# Patient Record
Sex: MALE | Race: WHITE | Employment: OTHER | URBAN - METROPOLITAN AREA
[De-identification: names, ages, dates, MRNs, and addresses within clinical notes are randomized per-mention and may not be internally consistent; named-entity substitution may affect disease eponyms.]

---

## 2020-08-05 ENCOUNTER — CARE COORDINATION (OUTPATIENT)
Dept: CARE COORDINATION | Age: 65
End: 2020-08-05

## 2021-07-03 ENCOUNTER — APPOINTMENT (OUTPATIENT)
Dept: GENERAL RADIOLOGY | Age: 66
End: 2021-07-03
Payer: COMMERCIAL

## 2021-07-03 ENCOUNTER — APPOINTMENT (OUTPATIENT)
Dept: CT IMAGING | Age: 66
End: 2021-07-03
Payer: COMMERCIAL

## 2021-07-03 ENCOUNTER — HOSPITAL ENCOUNTER (EMERGENCY)
Age: 66
Discharge: HOME | End: 2021-07-03
Attending: EMERGENCY MEDICINE
Payer: COMMERCIAL

## 2021-07-03 VITALS
HEART RATE: 95 BPM | BODY MASS INDEX: 23.7 KG/M2 | HEIGHT: 69 IN | RESPIRATION RATE: 21 BRPM | OXYGEN SATURATION: 95 % | TEMPERATURE: 98 F | DIASTOLIC BLOOD PRESSURE: 67 MMHG | SYSTOLIC BLOOD PRESSURE: 107 MMHG | WEIGHT: 160 LBS

## 2021-07-03 DIAGNOSIS — R06.89 DYSPNEA AND RESPIRATORY ABNORMALITIES: ICD-10-CM

## 2021-07-03 DIAGNOSIS — F10.920 ACUTE ALCOHOLIC INTOXICATION WITHOUT COMPLICATION (HCC): Primary | ICD-10-CM

## 2021-07-03 DIAGNOSIS — R06.00 DYSPNEA AND RESPIRATORY ABNORMALITIES: ICD-10-CM

## 2021-07-03 LAB
A/G RATIO: 0.8 (ref 1.1–2.2)
ALBUMIN SERPL-MCNC: 3.6 G/DL (ref 3.4–5)
ALP BLD-CCNC: 84 U/L (ref 40–129)
ALT SERPL-CCNC: 15 U/L (ref 10–40)
ANION GAP SERPL CALCULATED.3IONS-SCNC: 14 MMOL/L (ref 3–16)
AST SERPL-CCNC: 28 U/L (ref 15–37)
BASE EXCESS VENOUS: -2 MMOL/L (ref -3–3)
BASOPHILS ABSOLUTE: 0.1 K/UL (ref 0–0.2)
BASOPHILS RELATIVE PERCENT: 1.1 %
BILIRUB SERPL-MCNC: <0.2 MG/DL (ref 0–1)
BUN BLDV-MCNC: 5 MG/DL (ref 7–20)
CALCIUM SERPL-MCNC: 8.6 MG/DL (ref 8.3–10.6)
CARBOXYHEMOGLOBIN: 7.5 % (ref 0–1.5)
CHLORIDE BLD-SCNC: 96 MMOL/L (ref 99–110)
CO2: 22 MMOL/L (ref 21–32)
CREAT SERPL-MCNC: 0.6 MG/DL (ref 0.8–1.3)
EOSINOPHILS ABSOLUTE: 0.1 K/UL (ref 0–0.6)
EOSINOPHILS RELATIVE PERCENT: 1.1 %
GFR AFRICAN AMERICAN: >60
GFR NON-AFRICAN AMERICAN: >60
GLOBULIN: 4.5 G/DL
GLUCOSE BLD-MCNC: 100 MG/DL (ref 70–99)
GLUCOSE BLD-MCNC: 100 MG/DL (ref 70–99)
HCO3 VENOUS: 22.4 MMOL/L (ref 23–29)
HCT VFR BLD CALC: 43.5 % (ref 40.5–52.5)
HEMOGLOBIN: 14.7 G/DL (ref 13.5–17.5)
LYMPHOCYTES ABSOLUTE: 1.7 K/UL (ref 1–5.1)
LYMPHOCYTES RELATIVE PERCENT: 23.6 %
MCH RBC QN AUTO: 32.4 PG (ref 26–34)
MCHC RBC AUTO-ENTMCNC: 33.8 G/DL (ref 31–36)
MCV RBC AUTO: 95.9 FL (ref 80–100)
METHEMOGLOBIN VENOUS: 0.1 %
MONOCYTES ABSOLUTE: 0.8 K/UL (ref 0–1.3)
MONOCYTES RELATIVE PERCENT: 10.9 %
NEUTROPHILS ABSOLUTE: 4.5 K/UL (ref 1.7–7.7)
NEUTROPHILS RELATIVE PERCENT: 63.3 %
O2 CONTENT, VEN: 20 VOL %
O2 SAT, VEN: 100 %
O2 THERAPY: ABNORMAL
PCO2, VEN: 36.5 MMHG (ref 40–50)
PDW BLD-RTO: 14.3 % (ref 12.4–15.4)
PERFORMED ON: ABNORMAL
PH VENOUS: 7.4 (ref 7.35–7.45)
PLATELET # BLD: 345 K/UL (ref 135–450)
PMV BLD AUTO: 7.7 FL (ref 5–10.5)
PO2, VEN: 171 MMHG (ref 25–40)
POTASSIUM REFLEX MAGNESIUM: 4 MMOL/L (ref 3.5–5.1)
PRO-BNP: 191 PG/ML (ref 0–124)
RBC # BLD: 4.54 M/UL (ref 4.2–5.9)
SODIUM BLD-SCNC: 132 MMOL/L (ref 136–145)
TCO2 CALC VENOUS: 53 MMOL/L
TOTAL PROTEIN: 8.1 G/DL (ref 6.4–8.2)
TROPONIN: <0.01 NG/ML
WBC # BLD: 7.1 K/UL (ref 4–11)

## 2021-07-03 PROCEDURE — 94761 N-INVAS EAR/PLS OXIMETRY MLT: CPT

## 2021-07-03 PROCEDURE — 93005 ELECTROCARDIOGRAM TRACING: CPT | Performed by: EMERGENCY MEDICINE

## 2021-07-03 PROCEDURE — 82803 BLOOD GASES ANY COMBINATION: CPT

## 2021-07-03 PROCEDURE — 96375 TX/PRO/DX INJ NEW DRUG ADDON: CPT

## 2021-07-03 PROCEDURE — 85025 COMPLETE CBC W/AUTO DIFF WBC: CPT

## 2021-07-03 PROCEDURE — 80053 COMPREHEN METABOLIC PANEL: CPT

## 2021-07-03 PROCEDURE — 71045 X-RAY EXAM CHEST 1 VIEW: CPT

## 2021-07-03 PROCEDURE — 96374 THER/PROPH/DIAG INJ IV PUSH: CPT

## 2021-07-03 PROCEDURE — 2580000003 HC RX 258: Performed by: EMERGENCY MEDICINE

## 2021-07-03 PROCEDURE — 6360000002 HC RX W HCPCS: Performed by: EMERGENCY MEDICINE

## 2021-07-03 PROCEDURE — 94640 AIRWAY INHALATION TREATMENT: CPT

## 2021-07-03 PROCEDURE — 83880 ASSAY OF NATRIURETIC PEPTIDE: CPT

## 2021-07-03 PROCEDURE — 99285 EMERGENCY DEPT VISIT HI MDM: CPT

## 2021-07-03 PROCEDURE — 70450 CT HEAD/BRAIN W/O DYE: CPT

## 2021-07-03 PROCEDURE — 6370000000 HC RX 637 (ALT 250 FOR IP): Performed by: EMERGENCY MEDICINE

## 2021-07-03 PROCEDURE — 84484 ASSAY OF TROPONIN QUANT: CPT

## 2021-07-03 RX ORDER — LORAZEPAM 2 MG/ML
1 INJECTION INTRAMUSCULAR ONCE
Status: COMPLETED | OUTPATIENT
Start: 2021-07-03 | End: 2021-07-03

## 2021-07-03 RX ORDER — METHYLPREDNISOLONE SODIUM SUCCINATE 125 MG/2ML
80 INJECTION, POWDER, LYOPHILIZED, FOR SOLUTION INTRAMUSCULAR; INTRAVENOUS ONCE
Status: COMPLETED | OUTPATIENT
Start: 2021-07-03 | End: 2021-07-03

## 2021-07-03 RX ORDER — IPRATROPIUM BROMIDE AND ALBUTEROL SULFATE 2.5; .5 MG/3ML; MG/3ML
1 SOLUTION RESPIRATORY (INHALATION) ONCE
Status: COMPLETED | OUTPATIENT
Start: 2021-07-03 | End: 2021-07-03

## 2021-07-03 RX ORDER — 0.9 % SODIUM CHLORIDE 0.9 %
1000 INTRAVENOUS SOLUTION INTRAVENOUS ONCE
Status: COMPLETED | OUTPATIENT
Start: 2021-07-03 | End: 2021-07-03

## 2021-07-03 RX ADMIN — SODIUM CHLORIDE 1000 ML: 9 INJECTION, SOLUTION INTRAVENOUS at 12:02

## 2021-07-03 RX ADMIN — IPRATROPIUM BROMIDE AND ALBUTEROL SULFATE 1 AMPULE: .5; 3 SOLUTION RESPIRATORY (INHALATION) at 11:46

## 2021-07-03 RX ADMIN — METHYLPREDNISOLONE SODIUM SUCCINATE 80 MG: 125 INJECTION, POWDER, FOR SOLUTION INTRAMUSCULAR; INTRAVENOUS at 12:03

## 2021-07-03 RX ADMIN — LORAZEPAM 1 MG: 2 INJECTION INTRAMUSCULAR; INTRAVENOUS at 12:02

## 2021-07-03 NOTE — ED PROVIDER NOTES
EMERGENCY DEPARTMENT PROVIDER NOTE    Patient Identification  Pt Name: Laurel Brooks  MRN: [id number]  [place] 1955  Date of evaluation: 7/3/2021  Provider: Nicola Ortiz DO  PCP: No primary care provider on file. Chief Complaint  Alcohol Intoxication (pt states 3 25 oz 8% ETOH beers this am and 3 last night, pt seen by neighbors attempting to get up from ground unable, urinated on self as well, no family to care for)      HPI  (History provided by patient)  This is a 78 y.o. male with pertinent past medical history of tobacco use, COPD who was brought in by EMS transportation for alcohol intoxication. Patient was found in front of his home on the ground, he was alert however he was unable to stand under his own power and thus EMS was called. Patient states has been drinking multiple 25 ounce beers this morning, states this is fairly typical for him. He denies any history of alcohol withdrawal symptoms in the past.  He does report some mild shortness of breath, nothing seems to make this any better or worse. He attributes this to smoking. He denies any fevers, chills, chest pains, headaches, focal weakness or numbness. He denies any suicidal ideation. .     ROS    Const:  No fevers, no chills, no generalized weakness  Skin:  No rash, no lesions  Eyes:  No visual changes, no blurry or double vision, no pain  ENT:  No sore throat, no difficulty swallowing, no ear pain, no sinus pain or congestion  Card:  No chest pain, no palpitations, no edema  Resp:  +shortness of breath, +cough (chronic), no wheezing  Abd:  No abdominal pain, no nausea, no vomiting, no diarrhea  Genitourinary:  No dysuria, no hematuria  MSK:  No joint pain, no myalgia  Neuro:  No focal weakness, no headache, no paresthesia    All other systems reviewed and negative unless otherwise noted in HPI        I have reviewed the following nursing documentation:  Allergies: Patient has no known allergies.     Past medical history: Past Medical History:   Diagnosis Date    COPD (chronic obstructive pulmonary disease) ([facility] [address]      Past surgical history: No past surgical history on file. Home medications:   Previous Medications    UNKNOWN TO PATIENT    Pt states takes inhalers, nebulizers but unsure what       Social history:  reports that he has been smoking cigarettes. He has been smoking about 1.00 pack per day. He has never used smokeless tobacco. He reports current alcohol use of about 63.0 standard drinks of alcohol per week. He reports current drug use. Drug: Marijuana. Family history:  No family history on file. Exam  ED Triage Vitals [07/03/21 1020]   BP Temp Temp Source Pulse Resp SpO2 Height Weight   (!) 136/92 98 °F (36.7 °C) Oral 108 18 95 % 5' 9\" (1.753 m) 160 lb (72.6 kg)     Nursing note and vitals reviewed. Constitutional: Well developed, well nourished. Non-toxic in appearance. HENT:      Head: Normocephalic and atraumatic. Ears: External ears normal.      Nose: Nose normal.     Mouth: Membrane mucosa moist and pink. Eyes: Anicteric sclera. No discharge. PERRL, normal test of skew  Neck: Supple. Trachea midline. Cardiovascular: Tachycardia, regular rhythm; no murmurs, rubs, or gallops. Pulmonary/Chest: Effort normal. No respiratory distress. Diffuse wheezes. No stridor. No wheezes. No rales. Abdominal: Soft. No distension. Nontender to deep palpation all quadrants  Musculoskeletal: Moves all extremities. No gross deformity. Neurological: Alert, oriented to person, place and month. Correctly names president. Face symmetric. Speech is slurred. CN 2-12 intact. 5/5 motor and sensation grossly intact all extremities. No pronator drift. Normal finger to nose, normal heel to shin. Downward Bab[place]is. Gait is unsteady, patient unable to ambulate without assistance. Skin: Warm and dry. No rash. Psychiatric: Normal mood and affect.  Behavior is normal.    Procedures      EKG    EKG was reviewed by emergency department physician in the absence of a cardiologist    Narrow complex sinus rhythm, rate 103, normal axis, normal FL and QRS intervals, normal Qtc, no ST elevations or depressions, normal t-wave morphology, impression sinus tachycardia, no STEMI      Radiology  XR CHEST PORTABLE   Final Result   No acute cardiopulmonary disease. Emphysematous changes. , Suspected bullous disease in the right upper lobe. CT Head WO Contrast   Final Result   No evidence of acute intracranial abnormality. There is diffuse cerebral   atrophy. Large amount of right-sided paranasal sinus disease, severe sinusitis versus   polyposis or a combination the most likely considerations.              Labs  Results for orders placed or performed during the hospital encounter of 07/03/21   CBC Auto Differential   Result Value Ref Range    WBC 7.1 4.0 - 11.0 K/uL    RBC 4.54 4.20 - 5.90 M/uL    Hemoglobin 14.7 13.5 - 17.5 g/dL    Hematocrit 43.5 40.5 - 52.5 %    MCV 95.9 80.0 - 100.0 fL    MCH 32.4 26.0 - 34.0 pg    MCHC 33.8 31.0 - 36.0 g/dL    RDW 14.3 12.4 - 15.4 %    Platelets 620 556 - 420 K/uL    MPV 7.7 5.0 - 10.5 fL    Neutrophils % 63.3 %    Lymphocytes % 23.6 %    Monocytes % 10.9 %    Eosinophils % 1.1 %    Basophils % 1.1 %    Neutrophils Absolute 4.5 1.7 - 7.7 K/uL    Lymphocytes Absolute 1.7 1.0 - 5.1 K/uL    Monocytes Absolute 0.8 0.0 - 1.3 K/uL    Eosinophils Absolute 0.1 0.0 - 0.6 K/uL    Basophils Absolute 0.1 0.0 - 0.2 K/uL   Comprehensive Metabolic Panel w/ Reflex to MG   Result Value Ref Range    Sodium 132 (L) 136 - 145 mmol/L    Potassium reflex Magnesium 4.0 3.5 - 5.1 mmol/L    Chloride 96 (L) 99 - 110 mmol/L    CO2 22 21 - 32 mmol/L    Anion Gap 14 3 - 16    Glucose 100 (H) 70 - 99 mg/dL    BUN 5 (L) 7 - 20 mg/dL    CREATININE 0.6 (L) 0.8 - 1.3 mg/dL    GFR Non-African American >60 >60    GFR African American >60 >60    Calcium 8.6 8.3 - 10.6 mg/dL    Total Protein 8.1 6.4 - 8.2 g/dL    Albumin 3.6 3.4 - 5.0 g/dL    Albumin/Globulin Ratio 0.8 (L) 1.1 - 2.2    Total Bilirubin <0.2 0.0 - 1.0 mg/dL    Alkaline Phosphatase 84 40 - 129 U/L    ALT 15 10 - 40 U/L    AST 28 15 - 37 U/L    Globulin 4.5 g/dL   Troponin   Result Value Ref Range    Troponin <0.01 <0.01 ng/mL   Brain Natriuretic Peptide   Result Value Ref Range    Pro- (H) 0 - 124 pg/mL   Blood Gas, Venous   Result Value Ref Range    pH, Jose 7.396 7.350 - 7.450    pCO2, Jose 36.5 (L) 40.0 - 50.0 mmHg    pO2, Jose 171.0 (H) 25 - 40 mmHg    HCO3, Venous 22.4 (L) 23.0 - 29.0 mmol/L    Base Excess, Jose -2.0 -3.0 - 3.0 mmol/L    O2 Sat, Jose 100 Not Established %    Carboxyhemoglobin 7.5 (H) 0.0 - 1.5 %    MetHgb, Jose 0.1 <1.5 %    TC02 (Calc), Jose 53 Not Established mmol/L    O2 Content, Jose 20 Not Established VOL %    O2 Therapy Unknown    POCT Glucose   Result Value Ref Range    POC Glucose 100 (H) 70 - 99 mg/dl    Performed on ACCU-CHEK    EKG 12 Lead   Result Value Ref Range    Ventricular Rate 103 BPM    Atrial Rate 103 BPM    P-R Interval 126 ms    QRS Duration 94 ms    Q-T Interval 366 ms    QTc Calculation (Bazett) 479 ms    P Axis 76 degrees    R Axis 35 degrees    T Axis 70 degrees    Diagnosis Sinus tachycardiaLow voltage QRSBorderline ECG        Screenings   [place] Coma Scale  Eye Opening: Spontaneous  Best Verbal Response: Oriented  Best Motor Response: Obeys commands  [place] Coma Scale Score: 15       MDM and ED Course    Patient afebrile and nontoxic. No distress. EKG is no STEMI, troponin normal, nothing to suggest ACS or malignant dysrhythmia. Neuro exam is nonfocal, I have very low suspicion for CVA/TIA. CT head without evidence of hemorrhage or mass-effect. No findings of infection, patient is not septic. Wheezes noted on exam, these were improved after breathing treatments and patient reported symptomatic improvement as well. He demonstrates no increased work of breathing or hypoxia.   Patient symptoms are consistent with acute alcohol intoxication and patient states has had a large amount of alcohol this morning. On my initial evaluation he was unable to safely ambulate without assistance. Patient remains pending re-evaluation for clinical sobriety at time of my end of shift, case discussed with Dr. Clinton Gutierrez at 1400 who will assume care. I anticipate discharge to home should patient symptoms continue to improve and he is able to safely ambulate. Final Impression  1. Acute alcoholic intoxication without complication ([address]    2. Dyspnea and respiratory abnormalities        Blood pressure 107/67, pulse 95, temperature 98 °F (36.7 °C), temperature source Oral, resp. rate 21, height 5' 9\" (1.753 m), weight 160 lb (72.6 kg), SpO2 95 %. Disposition:  DISPOSITION        Patient Referrals:  No follow-up provider specified. Discharge Medications:  New Prescriptions    No medications on file       Discontinued Medications:  Discontinued Medications    No medications on file       This chart was generated using the [address] [place] [address] [address] dictation system. I created this record but it may contain dictation errors given the limitations of this technology.     Teho Valdez DO (electronically signed)  Attending Emergency Physician       Theo Valdez DO  07/03/21 0867

## 2021-07-03 NOTE — ED NOTES
Bed: 11  Expected date:   Expected time:   Means of arrival: [facility]  Comments:  255 Kj Hammer RN  07/03/21 1122

## 2021-07-03 NOTE — ED NOTES
Pt ambulated to room 5 and back to room, gait steady, no assist needed, pt states feels better. Landlord to come  patient for discharge.       [address], RN  07/03/21 222 Jay Jordan RN  07/03/21 7732

## 2021-07-03 NOTE — ED PROVIDER NOTES
Signout to me at 1400;    Briefly, this is a 78 y.o. male here for alcohol intoxication. On exam, WDWN M, NAD, heart RRR, Lungs cTAB          Screenings     [place] Coma Scale  Eye Opening: Spontaneous  Best Verbal Response: Oriented  Best Motor Response: Obeys commands  [place] Coma Scale Score: 13             MDM  48-year-old male signed out to me today with alcohol intoxication. On my reassessment at 5 is now awake alert oriented ambulatory and desirous of discharge. No complaints. Will DC home. Emily Umaña MD  07/03/21 1834        Patient Referrals:  No follow-up provider specified. Discharge Medications:  New Prescriptions    No medications on file       FINAL IMPRESSION  1. Acute alcoholic intoxication without complication ([facility] [address]    2. Dyspnea and respiratory abnormalities        Blood pressure 107/67, pulse 95, temperature 98 °F (36.7 °C), temperature source Oral, resp. rate 21, height 5' 9\" (1.753 m), weight 160 lb (72.6 kg), SpO2 95 %.             Emily Umaña MD  07/03/21 [facility]

## 2021-07-04 LAB
EKG ATRIAL RATE: 103 BPM
EKG DIAGNOSIS: NORMAL
EKG P AXIS: 76 DEGREES
EKG P-R INTERVAL: 126 MS
EKG Q-T INTERVAL: 366 MS
EKG QRS DURATION: 94 MS
EKG QTC CALCULATION (BAZETT): 479 MS
EKG R AXIS: 35 DEGREES
EKG T AXIS: 70 DEGREES
EKG VENTRICULAR RATE: 103 BPM

## 2021-07-04 PROCEDURE — 93010 ELECTROCARDIOGRAM REPORT: CPT | Performed by: INTERNAL MEDICINE

## 2022-01-01 ENCOUNTER — APPOINTMENT (OUTPATIENT)
Dept: GENERAL RADIOLOGY | Age: 67
DRG: 871 | End: 2022-01-01
Payer: MEDICARE

## 2022-01-01 ENCOUNTER — APPOINTMENT (OUTPATIENT)
Dept: CT IMAGING | Age: 67
DRG: 871 | End: 2022-01-01
Payer: MEDICARE

## 2022-01-01 ENCOUNTER — HOSPITAL ENCOUNTER (INPATIENT)
Age: 67
LOS: 1 days | DRG: 871 | End: 2022-11-23
Attending: EMERGENCY MEDICINE | Admitting: INTERNAL MEDICINE
Payer: MEDICARE

## 2022-01-01 VITALS
WEIGHT: 150 LBS | TEMPERATURE: 98.1 F | OXYGEN SATURATION: 82 % | DIASTOLIC BLOOD PRESSURE: 79 MMHG | SYSTOLIC BLOOD PRESSURE: 103 MMHG | HEIGHT: 68 IN | BODY MASS INDEX: 22.73 KG/M2 | RESPIRATION RATE: 18 BRPM | HEART RATE: 106 BPM

## 2022-01-01 DIAGNOSIS — R09.02 HYPOXIA: ICD-10-CM

## 2022-01-01 DIAGNOSIS — J18.9 PNEUMONIA OF LEFT LUNG DUE TO INFECTIOUS ORGANISM, UNSPECIFIED PART OF LUNG: Primary | ICD-10-CM

## 2022-01-01 DIAGNOSIS — R04.2 HEMOPTYSIS: ICD-10-CM

## 2022-01-01 DIAGNOSIS — A41.9 SEPTICEMIA (HCC): ICD-10-CM

## 2022-01-01 LAB
A/G RATIO: 0.5 (ref 1.1–2.2)
ALBUMIN SERPL-MCNC: 2.4 G/DL (ref 3.4–5)
ALP BLD-CCNC: 135 U/L (ref 40–129)
ALT SERPL-CCNC: 12 U/L (ref 10–40)
ANION GAP SERPL CALCULATED.3IONS-SCNC: 12 MMOL/L (ref 3–16)
AST SERPL-CCNC: 19 U/L (ref 15–37)
ATYPICAL LYMPHOCYTE RELATIVE PERCENT: 1 % (ref 0–6)
BASOPHILS ABSOLUTE: 0 K/UL (ref 0–0.2)
BASOPHILS RELATIVE PERCENT: 0 %
BILIRUB SERPL-MCNC: 0.4 MG/DL (ref 0–1)
BUN BLDV-MCNC: 17 MG/DL (ref 7–20)
CALCIUM SERPL-MCNC: 8.7 MG/DL (ref 8.3–10.6)
CHLORIDE BLD-SCNC: 84 MMOL/L (ref 99–110)
CO2: 36 MMOL/L (ref 21–32)
CREAT SERPL-MCNC: 0.8 MG/DL (ref 0.8–1.3)
EOSINOPHILS ABSOLUTE: 0 K/UL (ref 0–0.6)
EOSINOPHILS RELATIVE PERCENT: 0 %
GFR SERPL CREATININE-BSD FRML MDRD: >60 ML/MIN/{1.73_M2}
GLUCOSE BLD-MCNC: 118 MG/DL (ref 70–99)
HCT VFR BLD CALC: 36.9 % (ref 40.5–52.5)
HEMOGLOBIN: 12.3 G/DL (ref 13.5–17.5)
LACTIC ACID, SEPSIS: 1.8 MMOL/L (ref 0.4–1.9)
LACTIC ACID, SEPSIS: 2.5 MMOL/L (ref 0.4–1.9)
LYMPHOCYTES ABSOLUTE: 0.5 K/UL (ref 1–5.1)
LYMPHOCYTES RELATIVE PERCENT: 0 %
MAGNESIUM: 2.4 MG/DL (ref 1.8–2.4)
MCH RBC QN AUTO: 31.1 PG (ref 26–34)
MCHC RBC AUTO-ENTMCNC: 33.4 G/DL (ref 31–36)
MCV RBC AUTO: 93.2 FL (ref 80–100)
MONOCYTES ABSOLUTE: 2.4 K/UL (ref 0–1.3)
MONOCYTES RELATIVE PERCENT: 5 %
NEUTROPHILS ABSOLUTE: 44.9 K/UL (ref 1.7–7.7)
NEUTROPHILS RELATIVE PERCENT: 94 %
PDW BLD-RTO: 13.6 % (ref 12.4–15.4)
PLATELET # BLD: 774 K/UL (ref 135–450)
PLATELET SLIDE REVIEW: ABNORMAL
PMV BLD AUTO: 8.6 FL (ref 5–10.5)
POLYCHROMASIA: ABNORMAL
POTASSIUM REFLEX MAGNESIUM: 3.4 MMOL/L (ref 3.5–5.1)
PRO-BNP: 1532 PG/ML (ref 0–124)
RAPID INFLUENZA  B AGN: NEGATIVE
RAPID INFLUENZA A AGN: NEGATIVE
RBC # BLD: 3.97 M/UL (ref 4.2–5.9)
SLIDE REVIEW: ABNORMAL
SODIUM BLD-SCNC: 132 MMOL/L (ref 136–145)
TOTAL PROTEIN: 7.1 G/DL (ref 6.4–8.2)
TOXIC GRANULATION: PRESENT
TROPONIN: 0.03 NG/ML
WBC # BLD: 47.8 K/UL (ref 4–11)

## 2022-01-01 PROCEDURE — 99285 EMERGENCY DEPT VISIT HI MDM: CPT

## 2022-01-01 PROCEDURE — 1200000000 HC SEMI PRIVATE

## 2022-01-01 PROCEDURE — 71260 CT THORAX DX C+: CPT | Performed by: EMERGENCY MEDICINE

## 2022-01-01 PROCEDURE — 6360000002 HC RX W HCPCS

## 2022-01-01 PROCEDURE — 2500000003 HC RX 250 WO HCPCS

## 2022-01-01 PROCEDURE — 87632 RESP VIRUS 6-11 TARGETS: CPT

## 2022-01-01 PROCEDURE — 94640 AIRWAY INHALATION TREATMENT: CPT

## 2022-01-01 PROCEDURE — 31500 INSERT EMERGENCY AIRWAY: CPT

## 2022-01-01 PROCEDURE — 85025 COMPLETE CBC W/AUTO DIFF WBC: CPT

## 2022-01-01 PROCEDURE — 0BH17EZ INSERTION OF ENDOTRACHEAL AIRWAY INTO TRACHEA, VIA NATURAL OR ARTIFICIAL OPENING: ICD-10-PCS | Performed by: INTERNAL MEDICINE

## 2022-01-01 PROCEDURE — 84484 ASSAY OF TROPONIN QUANT: CPT

## 2022-01-01 PROCEDURE — 87449 NOS EACH ORGANISM AG IA: CPT

## 2022-01-01 PROCEDURE — 6360000004 HC RX CONTRAST MEDICATION: Performed by: EMERGENCY MEDICINE

## 2022-01-01 PROCEDURE — 5A1935Z RESPIRATORY VENTILATION, LESS THAN 24 CONSECUTIVE HOURS: ICD-10-PCS | Performed by: INTERNAL MEDICINE

## 2022-01-01 PROCEDURE — 87804 INFLUENZA ASSAY W/OPTIC: CPT

## 2022-01-01 PROCEDURE — 36415 COLL VENOUS BLD VENIPUNCTURE: CPT

## 2022-01-01 PROCEDURE — 71045 X-RAY EXAM CHEST 1 VIEW: CPT

## 2022-01-01 PROCEDURE — 02HV33Z INSERTION OF INFUSION DEVICE INTO SUPERIOR VENA CAVA, PERCUTANEOUS APPROACH: ICD-10-PCS | Performed by: INTERNAL MEDICINE

## 2022-01-01 PROCEDURE — 96367 TX/PROPH/DG ADDL SEQ IV INF: CPT

## 2022-01-01 PROCEDURE — 96375 TX/PRO/DX INJ NEW DRUG ADDON: CPT

## 2022-01-01 PROCEDURE — 83735 ASSAY OF MAGNESIUM: CPT

## 2022-01-01 PROCEDURE — 2580000003 HC RX 258: Performed by: EMERGENCY MEDICINE

## 2022-01-01 PROCEDURE — 6370000000 HC RX 637 (ALT 250 FOR IP): Performed by: INTERNAL MEDICINE

## 2022-01-01 PROCEDURE — 6370000000 HC RX 637 (ALT 250 FOR IP): Performed by: EMERGENCY MEDICINE

## 2022-01-01 PROCEDURE — 80053 COMPREHEN METABOLIC PANEL: CPT

## 2022-01-01 PROCEDURE — 6360000002 HC RX W HCPCS: Performed by: EMERGENCY MEDICINE

## 2022-01-01 PROCEDURE — 83605 ASSAY OF LACTIC ACID: CPT

## 2022-01-01 PROCEDURE — 2700000000 HC OXYGEN THERAPY PER DAY

## 2022-01-01 PROCEDURE — 93005 ELECTROCARDIOGRAM TRACING: CPT | Performed by: EMERGENCY MEDICINE

## 2022-01-01 PROCEDURE — 94761 N-INVAS EAR/PLS OXIMETRY MLT: CPT

## 2022-01-01 PROCEDURE — 96365 THER/PROPH/DIAG IV INF INIT: CPT

## 2022-01-01 PROCEDURE — 87040 BLOOD CULTURE FOR BACTERIA: CPT

## 2022-01-01 PROCEDURE — 83880 ASSAY OF NATRIURETIC PEPTIDE: CPT

## 2022-01-01 RX ORDER — PROMETHAZINE HYDROCHLORIDE AND CODEINE PHOSPHATE 6.25; 1 MG/5ML; MG/5ML
5 SYRUP ORAL EVERY 4 HOURS PRN
Status: DISCONTINUED | OUTPATIENT
Start: 2022-01-01 | End: 2022-11-24 | Stop reason: HOSPADM

## 2022-01-01 RX ORDER — ATROPINE SULFATE 0.1 MG/ML
INJECTION INTRAVENOUS
Status: DISCONTINUED
Start: 2022-01-01 | End: 2022-11-24 | Stop reason: HOSPADM

## 2022-01-01 RX ORDER — LEVOFLOXACIN 500 MG/1
500 TABLET, FILM COATED ORAL DAILY
Status: DISCONTINUED | OUTPATIENT
Start: 2022-01-01 | End: 2022-11-24 | Stop reason: HOSPADM

## 2022-01-01 RX ORDER — SODIUM CHLORIDE 0.9 % (FLUSH) 0.9 %
5-40 SYRINGE (ML) INJECTION PRN
Status: DISCONTINUED | OUTPATIENT
Start: 2022-01-01 | End: 2022-11-24 | Stop reason: HOSPADM

## 2022-01-01 RX ORDER — ACETAMINOPHEN 325 MG/1
650 TABLET ORAL EVERY 6 HOURS PRN
Status: DISCONTINUED | OUTPATIENT
Start: 2022-01-01 | End: 2022-11-24 | Stop reason: HOSPADM

## 2022-01-01 RX ORDER — DIAZEPAM 5 MG/ML
5 INJECTION, SOLUTION INTRAMUSCULAR; INTRAVENOUS EVERY 4 HOURS PRN
Status: DISCONTINUED | OUTPATIENT
Start: 2022-01-01 | End: 2022-11-24 | Stop reason: HOSPADM

## 2022-01-01 RX ORDER — PROPOFOL 10 MG/ML
INJECTION, EMULSION INTRAVENOUS
Status: COMPLETED
Start: 2022-01-01 | End: 2022-01-01

## 2022-01-01 RX ORDER — MORPHINE SULFATE 4 MG/ML
4 INJECTION, SOLUTION INTRAMUSCULAR; INTRAVENOUS EVERY 4 HOURS PRN
Status: DISCONTINUED | OUTPATIENT
Start: 2022-01-01 | End: 2022-11-24 | Stop reason: HOSPADM

## 2022-01-01 RX ORDER — AMIODARONE HYDROCHLORIDE 50 MG/ML
INJECTION, SOLUTION INTRAVENOUS
Status: DISCONTINUED
Start: 2022-01-01 | End: 2022-11-24 | Stop reason: HOSPADM

## 2022-01-01 RX ORDER — SODIUM CHLORIDE 9 MG/ML
INJECTION, SOLUTION INTRAVENOUS PRN
Status: DISCONTINUED | OUTPATIENT
Start: 2022-01-01 | End: 2022-11-24 | Stop reason: HOSPADM

## 2022-01-01 RX ORDER — DEXMEDETOMIDINE HYDROCHLORIDE 4 UG/ML
0.2 INJECTION INTRAVENOUS CONTINUOUS
Status: DISCONTINUED | OUTPATIENT
Start: 2022-01-01 | End: 2022-01-01 | Stop reason: SDUPTHER

## 2022-01-01 RX ORDER — ALBUTEROL SULFATE 90 UG/1
2 AEROSOL, METERED RESPIRATORY (INHALATION) EVERY 6 HOURS PRN
COMMUNITY

## 2022-01-01 RX ORDER — SODIUM CHLORIDE 0.9 % (FLUSH) 0.9 %
5-40 SYRINGE (ML) INJECTION EVERY 12 HOURS SCHEDULED
Status: DISCONTINUED | OUTPATIENT
Start: 2022-01-01 | End: 2022-11-24 | Stop reason: HOSPADM

## 2022-01-01 RX ORDER — ACETAMINOPHEN 650 MG/1
650 SUPPOSITORY RECTAL EVERY 6 HOURS PRN
Status: DISCONTINUED | OUTPATIENT
Start: 2022-01-01 | End: 2022-11-24 | Stop reason: HOSPADM

## 2022-01-01 RX ORDER — 0.9 % SODIUM CHLORIDE 0.9 %
1000 INTRAVENOUS SOLUTION INTRAVENOUS ONCE
Status: COMPLETED | OUTPATIENT
Start: 2022-01-01 | End: 2022-01-01

## 2022-01-01 RX ORDER — DEXAMETHASONE SODIUM PHOSPHATE 10 MG/ML
10 INJECTION, SOLUTION INTRAMUSCULAR; INTRAVENOUS ONCE
Status: COMPLETED | OUTPATIENT
Start: 2022-01-01 | End: 2022-01-01

## 2022-01-01 RX ORDER — PANTOPRAZOLE SODIUM 40 MG/10ML
40 INJECTION, POWDER, LYOPHILIZED, FOR SOLUTION INTRAVENOUS 2 TIMES DAILY
Status: DISCONTINUED | OUTPATIENT
Start: 2022-01-01 | End: 2022-11-24 | Stop reason: HOSPADM

## 2022-01-01 RX ORDER — AMIODARONE HYDROCHLORIDE 50 MG/ML
INJECTION, SOLUTION INTRAVENOUS
Status: DISCONTINUED
Start: 2022-01-01 | End: 2022-01-01 | Stop reason: WASHOUT

## 2022-01-01 RX ORDER — VANCOMYCIN HYDROCHLORIDE 1 G/200ML
1000 INJECTION, SOLUTION INTRAVENOUS ONCE
Status: DISCONTINUED | OUTPATIENT
Start: 2022-01-01 | End: 2022-01-01 | Stop reason: SDUPTHER

## 2022-01-01 RX ORDER — ONDANSETRON 4 MG/1
4 TABLET, ORALLY DISINTEGRATING ORAL EVERY 8 HOURS PRN
Status: DISCONTINUED | OUTPATIENT
Start: 2022-01-01 | End: 2022-11-24 | Stop reason: HOSPADM

## 2022-01-01 RX ORDER — NICOTINE 21 MG/24HR
1 PATCH, TRANSDERMAL 24 HOURS TRANSDERMAL DAILY
Status: DISCONTINUED | OUTPATIENT
Start: 2022-01-01 | End: 2022-11-24 | Stop reason: HOSPADM

## 2022-01-01 RX ORDER — POLYETHYLENE GLYCOL 3350 17 G/17G
17 POWDER, FOR SOLUTION ORAL DAILY PRN
Status: DISCONTINUED | OUTPATIENT
Start: 2022-01-01 | End: 2022-11-24 | Stop reason: HOSPADM

## 2022-01-01 RX ORDER — IPRATROPIUM BROMIDE AND ALBUTEROL SULFATE 2.5; .5 MG/3ML; MG/3ML
3 SOLUTION RESPIRATORY (INHALATION) ONCE
Status: COMPLETED | OUTPATIENT
Start: 2022-01-01 | End: 2022-01-01

## 2022-01-01 RX ORDER — NOREPINEPHRINE BIT/0.9 % NACL 16MG/250ML
1-100 INFUSION BOTTLE (ML) INTRAVENOUS CONTINUOUS
Status: DISCONTINUED | OUTPATIENT
Start: 2022-01-01 | End: 2022-11-24 | Stop reason: HOSPADM

## 2022-01-01 RX ORDER — METHYLPREDNISOLONE SODIUM SUCCINATE 40 MG/ML
40 INJECTION, POWDER, LYOPHILIZED, FOR SOLUTION INTRAMUSCULAR; INTRAVENOUS EVERY 6 HOURS
Status: DISCONTINUED | OUTPATIENT
Start: 2022-01-01 | End: 2022-11-24 | Stop reason: HOSPADM

## 2022-01-01 RX ORDER — NOREPINEPHRINE BIT/0.9 % NACL 16MG/250ML
INFUSION BOTTLE (ML) INTRAVENOUS
Status: COMPLETED
Start: 2022-01-01 | End: 2022-01-01

## 2022-01-01 RX ORDER — PROPOFOL 10 MG/ML
5-50 INJECTION, EMULSION INTRAVENOUS CONTINUOUS
Status: DISCONTINUED | OUTPATIENT
Start: 2022-01-01 | End: 2022-01-01

## 2022-01-01 RX ORDER — ALBUTEROL SULFATE 90 UG/1
2 AEROSOL, METERED RESPIRATORY (INHALATION) EVERY 6 HOURS PRN
Status: DISCONTINUED | OUTPATIENT
Start: 2022-01-01 | End: 2022-11-24 | Stop reason: HOSPADM

## 2022-01-01 RX ORDER — FENTANYL CITRATE-0.9 % NACL/PF 10 MCG/ML
25-200 PLASTIC BAG, INJECTION (ML) INTRAVENOUS CONTINUOUS
Status: DISCONTINUED | OUTPATIENT
Start: 2022-01-01 | End: 2022-01-01 | Stop reason: SDUPTHER

## 2022-01-01 RX ORDER — SODIUM CHLORIDE 9 MG/ML
INJECTION, SOLUTION INTRAVENOUS CONTINUOUS
Status: DISCONTINUED | OUTPATIENT
Start: 2022-01-01 | End: 2022-11-24 | Stop reason: HOSPADM

## 2022-01-01 RX ORDER — DEXMEDETOMIDINE HYDROCHLORIDE 4 UG/ML
.1-1.5 INJECTION, SOLUTION INTRAVENOUS CONTINUOUS
Status: DISCONTINUED | OUTPATIENT
Start: 2022-01-01 | End: 2022-11-24 | Stop reason: HOSPADM

## 2022-01-01 RX ORDER — ONDANSETRON 2 MG/ML
4 INJECTION INTRAMUSCULAR; INTRAVENOUS EVERY 6 HOURS PRN
Status: DISCONTINUED | OUTPATIENT
Start: 2022-01-01 | End: 2022-11-24 | Stop reason: HOSPADM

## 2022-01-01 RX ADMIN — IPRATROPIUM BROMIDE AND ALBUTEROL SULFATE 3 AMPULE: .5; 3 SOLUTION RESPIRATORY (INHALATION) at 14:59

## 2022-01-01 RX ADMIN — PROPOFOL 30 MCG/KG/MIN: 10 INJECTION, EMULSION INTRAVENOUS at 19:40

## 2022-01-01 RX ADMIN — VANCOMYCIN HYDROCHLORIDE 1000 MG: 1 INJECTION, POWDER, LYOPHILIZED, FOR SOLUTION INTRAVENOUS at 16:38

## 2022-01-01 RX ADMIN — DEXAMETHASONE SODIUM PHOSPHATE 10 MG: 10 INJECTION INTRAMUSCULAR; INTRAVENOUS at 14:30

## 2022-01-01 RX ADMIN — Medication 5 MCG/MIN: at 21:15

## 2022-01-01 RX ADMIN — Medication 25 MCG/HR: at 21:23

## 2022-01-01 RX ADMIN — CEFEPIME 2000 MG: 2 INJECTION, POWDER, FOR SOLUTION INTRAVENOUS at 15:09

## 2022-01-01 RX ADMIN — IOPAMIDOL 75 ML: 755 INJECTION, SOLUTION INTRAVENOUS at 15:45

## 2022-01-01 RX ADMIN — SODIUM CHLORIDE 1000 ML: 9 INJECTION, SOLUTION INTRAVENOUS at 18:49

## 2022-01-01 ASSESSMENT — LIFESTYLE VARIABLES
HOW MANY STANDARD DRINKS CONTAINING ALCOHOL DO YOU HAVE ON A TYPICAL DAY: 3 OR 4
HOW OFTEN DO YOU HAVE A DRINK CONTAINING ALCOHOL: 2-3 TIMES A WEEK

## 2022-01-01 ASSESSMENT — PULMONARY FUNCTION TESTS: PIF_VALUE: 42

## 2022-11-23 PROBLEM — D75.839 THROMBOCYTOSIS: Status: ACTIVE | Noted: 2022-01-01

## 2022-11-23 PROBLEM — E87.1 HYPONATREMIA: Status: ACTIVE | Noted: 2022-01-01

## 2022-11-23 PROBLEM — F10.10 ALCOHOL ABUSE: Status: ACTIVE | Noted: 2022-01-01

## 2022-11-23 PROBLEM — R63.4 WEIGHT LOSS, UNINTENTIONAL: Status: ACTIVE | Noted: 2022-01-01

## 2022-11-23 PROBLEM — D72.829 LEUKOCYTOSIS: Status: ACTIVE | Noted: 2022-01-01

## 2022-11-23 PROBLEM — D64.9 ANEMIA: Status: ACTIVE | Noted: 2022-01-01

## 2022-11-23 PROBLEM — J96.01 ACUTE RESPIRATORY FAILURE WITH HYPOXIA (HCC): Status: ACTIVE | Noted: 2022-01-01

## 2022-11-23 PROBLEM — A41.9 SEPSIS (HCC): Status: ACTIVE | Noted: 2022-01-01

## 2022-11-23 PROBLEM — E87.6 HYPOKALEMIA: Status: ACTIVE | Noted: 2022-01-01

## 2022-11-23 PROBLEM — J44.1 COPD WITH ACUTE EXACERBATION (HCC): Status: ACTIVE | Noted: 2022-01-01

## 2022-11-23 PROBLEM — Z72.0 TOBACCO ABUSE DISORDER: Status: ACTIVE | Noted: 2022-01-01

## 2022-11-23 PROBLEM — R04.2 HEMOPTYSIS: Status: ACTIVE | Noted: 2022-01-01

## 2022-11-23 PROBLEM — J18.9 PNEUMONIA: Status: ACTIVE | Noted: 2022-01-01

## 2022-11-23 PROBLEM — J44.9 COPD (CHRONIC OBSTRUCTIVE PULMONARY DISEASE) (HCC): Status: ACTIVE | Noted: 2022-01-01

## 2022-11-23 NOTE — ED PROVIDER NOTES
[address] PROVIDER NOTE    Patient Identification  Pt Name: Supa Gutierrez  MRN: [id number]  [place] 1955  Date of evaluation: 11/23/2022  Provider: Lucas Shaffer MD  PCP: No primary care provider on file. Chief Complaint  Respiratory Distress (Arrived per [facility] from home d/t respiratory distress; fall 10 days ago; respiratory distress started 10 days ago; coughing up blood; ST; O2 @5 L 83%; currently on 10 l non-rebreather up to 94%; )      HPI  History provided by patient   This is a 75 y.o. male who presents to the ED for coughing up blood. Started today. Also feeling weak and lightheaded. Never had this before. He has shortness of breath but states that it is at his baseline. He was just started on oxygen yesterday. No nausea or vomiting. No diarrhea. Denies chest discomfort. Denies fevers. He was found to be 83% on 5 L nasal cannula at home. Recent fall about 10 days ago. Denies any pain. [employer] [facility]  12 systems reviewed, pertinent positives/negatives per HPI otherwise noted to be negative. I have reviewed the following nursing documentation:  Allergies: Patient has no known allergies. Past medical history:   Past Medical History:   Diagnosis Date    COPD (chronic obstructive pulmonary disease) ([facility] [address]      Past surgical history: History reviewed. No pertinent surgical history. Home medications:   Previous Medications    ALBUTEROL SULFATE HFA (VENTOLIN HFA) 108 (90 BASE) MCG/ACT INHALER    Inhale 2 puffs into the lungs every 6 hours as needed for Wheezing    UNKNOWN TO PATIENT    Pt states takes inhalers, nebulizers but unsure what       Social history:  reports that he has been smoking cigarettes. He has been smoking an average of 1 pack per day. He has never used smokeless tobacco. He reports current alcohol use of about 63.0 standard drinks per week. He reports current drug use. Drug: Marijuana [place]).     Family history:  History reviewed. No pertinent family history. Exam  ED Triage Vitals   BP Temp Temp src Pulse Resp SpO2 Height Weight   -- -- -- -- -- -- -- --     Nursing note and vitals reviewed. Constitutional: In no acute distress  HENT:      Head: Normocephalic      Ears: External ears normal.      Nose: Nose normal.     Mouth: Membrane mucosa moist   Eyes: No discharge. Neck: Supple. Trachea midline. Cardiovascular: Regular rate. Warm extremities  Pulmonary/Chest: Effort increased. Bilateral rales  Abdominal: Soft. No distension. Nontender  : Deferred  Rectal: Deferred   Musculoskeletal: Moves all extremities. No gross deformity. Neurological: Alert and oriented. Face symmetric. Speech is clear. Skin: Warm and dry. Psychiatric: Normal mood and affect. Behavior is normal.    Procedures      EKG  The Ekg interpreted by me in the absence of a cardiologist shows. Normal sinus rhythm. 1mm st elevation v3 no reciprocal changes  Prolonged qt  HR 97  Compared to 7/21    Radiology  CT CHEST PULMONARY EMBOLISM W CONTRAST   Final Result   No evidence of pulmonary embolism or dissection. Severe bullous emphysema. Extensive consolidation within the left lower lung, some combination of   pneumonia, aspiration, and blood products. Neoplasm remains in the   differential.      Filling defects are seen within the lower lung airways on the left,   compatible with mucous plugging, blood products, or aspiration. XR CHEST 1 VIEW   Final Result   1. Left lower lobe airspace infiltrate and small left pleural effusion. This could represent pneumonia in the appropriate clinical setting. 2.  Bullous emphysematous change in the lung apices.              Labs  Results for orders placed or performed during the hospital encounter of 11/23/22   Rapid influenza A/B antigens    Specimen: Nasopharyngeal   Result Value Ref Range    Rapid Influenza A Ag Negative Negative    Rapid Influenza B Ag Negative Negative   CBC with Auto Differential   Result Value Ref Range    WBC 47.8 (HH) 4.0 - 11.0 K/uL    RBC 3.97 (L) 4.20 - 5.90 M/uL    Hemoglobin 12.3 (L) 13.5 - 17.5 g/dL    Hematocrit 36.9 (L) 40.5 - 52.5 %    MCV 93.2 80.0 - 100.0 fL    MCH 31.1 26.0 - 34.0 pg    MCHC 33.4 31.0 - 36.0 g/dL    RDW 13.6 12.4 - 15.4 %    Platelets 141 (H) 143 - 450 K/uL    MPV 8.6 5.0 - 10.5 fL    PLATELET SLIDE REVIEW Increased     SLIDE REVIEW see below     Neutrophils % 94.0 %    Lymphocytes % 0.0 %    Monocytes % 5.0 %    Eosinophils % 0.0 %    Basophils % 0.0 %    Neutrophils Absolute 44.9 (H) 1.7 - 7.7 K/uL    Lymphocytes Absolute 0.5 (L) 1.0 - 5.1 K/uL    Monocytes Absolute 2.4 (H) 0.0 - 1.3 K/uL    Eosinophils Absolute 0.0 0.0 - 0.6 K/uL    Basophils Absolute 0.0 0.0 - 0.2 K/uL    Atypical Lymphocytes Relative 1 0 - 6 %    Toxic Granulation Present (A)     Polychromasia Occasional (A)    CMP w/ Reflex to MG   Result Value Ref Range    Sodium 132 (L) 136 - 145 mmol/L    Potassium reflex Magnesium 3.4 (L) 3.5 - 5.1 mmol/L    Chloride 84 (L) 99 - 110 mmol/L    CO2 36 (H) 21 - 32 mmol/L    Anion Gap 12 3 - 16    Glucose 118 (H) 70 - 99 mg/dL    BUN 17 7 - 20 mg/dL    Creatinine 0.8 0.8 - 1.3 mg/dL    Est, Glom Filt Rate >60 >60    Calcium 8.7 8.3 - 10.6 mg/dL    Total Protein 7.1 6.4 - 8.2 g/dL    Albumin 2.4 (L) 3.4 - 5.0 g/dL    Albumin/Globulin Ratio 0.5 (L) 1.1 - 2.2    Total Bilirubin 0.4 0.0 - 1.0 mg/dL    Alkaline Phosphatase 135 (H) 40 - 129 U/L    ALT 12 10 - 40 U/L    AST 19 15 - 37 U/L   Troponin   Result Value Ref Range    Troponin 0.03 (H) <0.01 ng/mL   Brain Natriuretic Peptide   Result Value Ref Range    Pro-BNP 1,532 (H) 0 - 124 pg/mL   Lactate, Sepsis   Result Value Ref Range    Lactic Acid, Sepsis 2.5 (H) 0.4 - 1.9 mmol/L   Lactate, Sepsis   Result Value Ref Range    Lactic Acid, Sepsis 1.8 0.4 - 1.9 mmol/L   Magnesium   Result Value Ref Range    Magnesium 2.40 1.80 - 2.40 mg/dL   EKG 12 Lead   Result Value Ref Range    Ventricular MD  Authorized by: Daiana Meredith MD     Consent:     Consent obtained:  Verbal    Consent given by:  Patient  Pre-procedure details:     Indication: failure to oxygenate and predicted clinical deterioration      Patient status:  Awake    Look externally: facial hair      Mallampati score:  II    Obstruction: none      Neck mobility: normal      Pharmacologic strategy: RSI      Induction agents:  Etomidate    Paralytics:  Rocuronium  Procedure details:     Preoxygenation:  Nonrebreather mask    CPR in progress: no      Intubation method:  Oral    Intubation technique: video assisted      Laryngoscope blade:  Hypercurved    Bougie used: no      Grade view: I      Tube size (mm):  7.5    Tube type:  Cuffed    Number of attempts:  1    Ventilation between attempts: no      Tube visualized through cords: yes    Placement assessment:     ETT at teeth/gumline (cm):  25    Tube secured with:  ETT dunlap    Breath sounds:  Equal    Placement verification: chest rise and CXR verification    Post-procedure details:     Procedure completion:  Tolerated       The total critical care time spent while evaluating and treating this patient was at least 32 minutes. This excludes time spent doing separately billable procedures. This includes time at the bedside, data interpretation, medication management, obtaining critical history from collateral sources if the patient is unable to provide it directly, and physician consultation. Specifics of interventions taken and potentially life-threatening diagnostic considerations are listed above in the medical decision making. [unfilled]    Is this patient to be included in the SEP-1 Core Measure due to severe sepsis or septic shock? No   Exclusion criteria - the patient is NOT to be included for SEP-1 Core Measure due to:  May have criteria for sepsis, but does not meet criteria for severe sepsis or septic shock        Final Impression  1.  Pneumonia of left lung due to infectious organism, unspecified part of lung    2. Hemoptysis    3. Hypoxia    4. Septicemia ([facility] [address]        Blood pressure 124/79, pulse 100, temperature 98.1 °F (36.7 °C), temperature source Oral, resp. rate (!) 34, SpO2 97 %. Disposition:  DISPOSITION Admitted 11/23/2022 06:36:29 PM      Patient Referrals:  No follow-up provider specified. Discharge Medications:  New Prescriptions    No medications on file       Discontinued Medications:  Discontinued Medications    No medications on file       This chart was generated using the [address] [place], [place] [address]  dictation system. I created this record but it may contain dictation errors given the limitations of this technology.         Dania Marin MD  11/23/22 0763       Dania Marin MD  11/23/22 0576

## 2022-11-23 NOTE — LETTER
[facility]  [address] [facility] [place], [address]             November 23, 2022    Patient: Aletha Neal in care of Da Guy First   YOB: 1955   Date of Visit: 11/23/2022       To Whom It May Concern:    Aletha Neal was seen and treated in our emergency department on 11/23/2022. He may return to work on 11/25/2022. Sincerely,         [facility]  If you have any questions please feel free to call.

## 2022-11-23 NOTE — H&P
HOSPITALISTS HISTORY AND PHYSICAL    11/23/2022 6:36 PM    Patient Information:  Flo Laguna is a 75 y.o. male [id number]  PCP:  No primary care provider on file. (Tel: None )    Chief complaint:    Chief Complaint   Patient presents with    Respiratory Distress     Arrived per [facility] from home d/t respiratory distress; fall 10 days ago; respiratory distress started 10 days ago; coughing up blood; ST; O2 @5 L 83%; currently on 10 l non-rebreather up to 94%; History of Present Illness:  Patricia Shine is a 75 y.o. male with history of tobacco abuse, alcohol abuse who came to ER with complaints of hemoptysis and SOB. Patient has been using his brother's oxygen at home. Has been having worsening SOB for months and lost 10 lbs unintentionally in past month. Family forced patient to call for help today when he coughed up blood. Has SOB at rest that has progressed from dyspnea on exertion. Has L sided chest pain with cough. Some chills. No HA, abdominal pain, nausea, vomiting or abdominal pain. In ED, patient placed on 15 L HFNC. Found to have dense L consolidation on CT Chest.  Otherwise complete ROS is negative unless listed above. REVIEW OF SYSTEMS:   Pertinent positives as noted in HPI. All other systems were reviewed and are negative. Past Medical History:   has a past medical history of COPD (chronic obstructive pulmonary disease) ([facility] [address] Past Surgical History:   has no past surgical history on file. Medications:  No current facility-administered medications on file prior to encounter.      Current Outpatient Medications on File Prior to Encounter   Medication Sig Dispense Refill    albuterol sulfate HFA (VENTOLIN HFA) 108 (90 Base) MCG/ACT inhaler Inhale 2 puffs into the lungs every 6 hours as needed for Wheezing      UNKNOWN TO PATIENT Pt states takes inhalers, nebulizers but unsure what         Allergies:  No Known Allergies     Social History:  Patient Lives with family   reports that he has been smoking cigarettes. He has been smoking an average of 1 pack per day. He has never used smokeless tobacco. He reports current alcohol use of about 63.0 standard drinks per week. He reports current drug use. Drug: Marijuana Lennie Ege). Family History:  family history is not on file. Physical Exam:  /79   Pulse 100   Temp 98.1 °F (36.7 °C) (Oral)   Resp (!) 34   SpO2 97%     General appearance:  Appears comfortable. Well nourished  Eyes: Sclera clear, pupils equal  ENT: Moist mucus membranes, no thrush. Trachea midline. Cardiovascular: Tachycardia. No murmur, gallop, rub. No edema in lower extremities  Respiratory: Distant wheezing on R. Decreased on L side  Gastrointestinal: Abdomen soft, non-tender, not distended, normal bowel sounds  Musculoskeletal: No cyanosis in digits, neck supple  Neurology: Cranial nerves grossly intact. Alert and oriented in time, place and person. No speech or motor deficits  Psychiatry: Anxious affect.  Not agitated  Skin: Warm, dry, normal turgor, no rash  Brisk capillary refill, peripheral pulses palpable   Labs:  CBC:   Lab Results   Component Value Date/Time    WBC 47.8 11/23/2022 02:16 PM    RBC 3.97 11/23/2022 02:16 PM    HGB 12.3 11/23/2022 02:16 PM    HCT 36.9 11/23/2022 02:16 PM    MCV 93.2 11/23/2022 02:16 PM    MCH 31.1 11/23/2022 02:16 PM    MCHC 33.4 11/23/2022 02:16 PM    RDW 13.6 11/23/2022 02:16 PM     11/23/2022 02:16 PM    MPV 8.6 11/23/2022 02:16 PM     BMP:    Lab Results   Component Value Date/Time     11/23/2022 02:16 PM    K 3.4 11/23/2022 02:16 PM    CL 84 11/23/2022 02:16 PM    CO2 36 11/23/2022 02:16 PM    BUN 17 11/23/2022 02:16 PM    CREATININE 0.8 11/23/2022 02:16 PM    CALCIUM 8.7 11/23/2022 02:16 PM    GFRAA >60 07/03/2021 11:17 AM    LABGLOM >60 11/23/2022 02:16 PM    GLUCOSE 118 11/23/2022 02:16 PM     CT CHEST PULMONARY EMBOLISM W CONTRAST   Final Result   No evidence of pulmonary embolism or dissection. Severe bullous emphysema. Extensive consolidation within the left lower lung, some combination of   pneumonia, aspiration, and blood products. Neoplasm remains in the   differential.      Filling defects are seen within the lower lung airways on the left,   compatible with mucous plugging, blood products, or aspiration. XR CHEST 1 VIEW   Final Result   1. Left lower lobe airspace infiltrate and small left pleural effusion. This could represent pneumonia in the appropriate clinical setting. 2.  Bullous emphysematous change in the lung apices. Problem List  Principal Problem:    Hemoptysis  Active Problems:    Pneumonia    Hyponatremia    COPD (chronic obstructive pulmonary disease) ([facility])    Hypokalemia    Leukocytosis    Anemia    Thrombocytosis    Acute respiratory failure with hypoxia ([facility])    COPD with acute exacerbation ([facility])    Tobacco abuse disorder    Alcohol abuse    Weight loss, unintentional    Sepsis ([facility] [address]  Resolved Problems:    * No resolved hospital problems. *        Assessment/Plan:   Admit to ICU  Start Zosyn and Levaquin IV for suspected post obstructive vs aspiration PNA  Intubated in ER for worsening respiratory failure  Check ABG at 2230  Check CXR in AM  Vent orders placed  [facility] consult for critical care  Propofol gtt for sedation  Add pressors if hypotensive  Solumedrol 40 mg IV q6h for AECOPD  Add Dulera, Atrovent and Albuterol HFA  Nicotine patch  Sepsis orders  IVF      DVT prophylaxis SCD  Code status Full code  Diet NPO  IV access Peripheral   Calvillo Catheter Ordered    Admit as inpatient. I anticipate hospitalization spanning more than two midnights for investigation and treatment of the above medically necessary diagnoses. Discussed with patient, Dr Negar Caicedo (ER), Dr P[place] ([facility]) and ER nursing.     D/W daughter and niece on phone.    Kimberly Martines guarded outlook. I recommended family consider DNR status. Critical care time with patient was 90 minutes excluding separately billable procedures ([phone] on 11/23/22).     Chalino Belcher MD    11/23/2022 6:36 PM

## 2022-11-23 NOTE — ED NOTES
Julieta Harshjovan First daughter was at bedside. Update given. No questions comments or concerns voiced.       Sol Richard RN  11/23/22 4254

## 2022-11-23 NOTE — ED NOTES
Pts. brother Lucio [place], [phone]     Katerine Alamo, [facility]  11/23/22 2736

## 2022-11-24 LAB
EKG ATRIAL RATE: 97 BPM
EKG DIAGNOSIS: NORMAL
EKG P AXIS: 82 DEGREES
EKG P-R INTERVAL: 126 MS
EKG Q-T INTERVAL: 398 MS
EKG QRS DURATION: 102 MS
EKG QTC CALCULATION (BAZETT): 505 MS
EKG R AXIS: 59 DEGREES
EKG T AXIS: 77 DEGREES
EKG VENTRICULAR RATE: 97 BPM
L. PNEUMOPHILA SEROGP 1 UR AG: NORMAL
STREP PNEUMONIAE ANTIGEN, URINE: NORMAL

## 2022-11-24 PROCEDURE — 93010 ELECTROCARDIOGRAM REPORT: CPT | Performed by: INTERNAL MEDICINE

## 2022-11-24 NOTE — ED NOTES
Upon entering room patient was labored in breathing, diaphoretic and sats 56% on high flow, put patient on a non-breather and spoke with Dr. Jaja Haley and Dr Mackenzie Harrington patient to be intubated     Adams Monteiro RN  11/23/22 2000

## 2022-11-24 NOTE — ED NOTES
[facility] contacted, referral number [id number],      Michaela Guerrero, IKE  11/23/22 5087

## 2022-11-24 NOTE — PROGRESS NOTES
Clinical Pharmacy Note: Pharmacy to Dose Vancomycin    Fay Child is a 75 y.o. male started on Vancomycin for pneumonia; consult received from Dr. Anaid Rangel to manage therapy. Also receiving the following antibiotics: zosyn, levaquin. Goal AUC: 400-600 mg/L*hr  Goal Trough Level: 15-20 mcg/mL    Assessment/Plan:  A 1000 mg loading dose was given on 11/23/2022 at 1639  Initiate vancomycin 1000 mg IV every 12 hours. Bayesian modeling predicts an AUC of 573 mg/L*hr and a trough of 18.1 mcg/mL at steady state concentration. A vancomycin random level has been ordered for 11/23/2022 at 0600  Changes in regimen will be determined based on culture results, renal function, and clinical response. Pharmacy will continue to monitor and adjust regimen as necessary. Allergies:  Patient has no known allergies. Recent Labs     11/23/22  1416   CREATININE 0.8       Recent Labs     11/23/22  1416   WBC 47.8*       Ht Readings from Last 1 Encounters:   11/23/22 5' 8\" (1.727 m)        Wt Readings from Last 1 Encounters:   11/23/22 150 lb (68 kg)         Estimated Creatinine Clearance: 85 mL/min (based on SCr of 0.8 mg/dL).       Thank you for the consult,    Jem Bell, PharmD, [facility]

## 2022-11-24 NOTE — PROCEDURES
INDICATION:  Shock    ATTENDING PHYSICIAN: Preston Rajan    PROCEDURE: Central venous line placement right internal jugular vein    CONSENT:  The procedure, risks, and benefits were explained to family and consent was obtained. TIME OUT:  The patient and procedure were properly identified with the nurse in the room. STERILE PREP:  Full maximum sterile field/barrier technique was followed (with cap and mask and sterile gown and sterile gloves and large sterile sheet and hand hygeine and 2% chlorhexidine for cutaneous antisepsis). LOCAL ANESTHETIC:   Aqueous lidocaine 5cc    PROCEDURE:   Using direct ultrasound guidance, a right internal jugular vein central venous catheter was placed using a modified seldinger technique without difficulty. Excellent return of non-pulsatile, dark venous blood from all lumens. All lumens were flushed with normal saline. Minimal bleeding occurred and there was hemostasis prior to conclusion of procedure. Catheter was sutured in place and a sterile dressing with biopatch was placed.     COMPLICATIONS:  None  Estimated blood loss: 10 cc    CHEST XRAY REVIEWED: Post-procedure chest x-ray is pending at this time

## 2022-11-24 NOTE — ED NOTES
Toursades noted on monitor, epi given along with patient being shocked again at [address] [facility]  2G mag also given at this time      Daisy Ricci, RN  11/23/22 2300

## 2022-11-24 NOTE — ED NOTES
At pulse check patient appeared to be in vfib, patient was then shocked at [facility]  11/23/22 [address]

## 2022-11-24 NOTE — ED NOTES
Pt again shocked at [address] [facility], CPR resumed      Татьяна Fernandez [facility]  11/23/22 5095

## 2022-11-24 NOTE — SIGNIFICANT EVENT
I was asked to evaluate patient. On arrival, Patient was unresponsive, no corneal reflex, breath sounds on the right side with less being on the left side. Hypotensive MAP <60 and hypoxic 60s. Mechanical vent, 100% Fio2, Peep 10 RR20. ABG was unable to obtain. ET tube was evaluated, PEEP was increased with adjustment of mechanical ventilation, saturation improved to the 82-86s %. Blood pressure remained unstable, Central line placed, propofol held. Levophed initiated, bedside ultrasound showed engorged JVD and IVC was <50% collapsible. Patient BP improved with maps 70s. Given concern for patient's neuro function on exam, CT head was ordered. Patient then started to have bradycardia dropping from being tachycardic- 1 mg of atropine ordered with bed monitor showing maps sustaining above 65. Patient then immediately lost pulse with initial rhythm being PEA given no pulse. CODE BLUE was called on 0942 pm. After initial compression cycle, the rhythm was ventricular fibrillationx2, patient was shocked twice during ACLS, other rhythms included PEA and torsades which the patient patient was shocked as well. During the code, Patient was given IV magnesium epinephrine and amiodarone. During the code patient saturation maintained in the 50s with having bloody drainage coming out of the ET tube. At this point patient status is futile. Patient was called  on 09:59 PM. 1 minute of silence with staff. I talked to family about the status.     CCT : 46 minutes

## 2022-11-24 NOTE — ED NOTES
Pulse check, asystole noted, CPR resumed 150 amio given and epi     Barry Forrest RN  11/23/22 230       Barry Forrest RN  11/23/22 2309       Barry Forrest RN  11/23/22 4871

## 2022-11-24 NOTE — ED NOTES
Report called to ICU, RN verbalized understanding and denied any need for further information, patient to be transported to unit at this time      Vanessa Saab RN  11/23/22 2021

## 2022-11-24 NOTE — DISCHARGE SUMMARY
Hospital Medicine Discharge Summary    Patient: Tiffany Ledezma     Gender: male  : 1955   Age: 75 y.o. MRN: [id number]    Admitting Physician: Dane Knight MD  Discharge Physician: Dane Knight MD     Code Status: Prior     Admit Date: 2022   Expiration Date: 2022  at 2159 from tobacco abuse    Disposition:      Discharge Diagnoses: Active Hospital Problems    Diagnosis Date Noted    Hemoptysis [R04.2] 2022     Priority: Medium    Pneumonia [J18.9] 2022     Priority: Medium    Hyponatremia [E87.1] 2022     Priority: Medium    COPD (chronic obstructive pulmonary disease) ([address] [J44.9] 2022     Priority: Medium    Hypokalemia [E87.6] 2022     Priority: Medium    Leukocytosis [D72.829] 2022     Priority: Medium    Anemia [D64.9] 2022     Priority: Medium    Thrombocytosis [D75.839] 2022     Priority: Medium    Acute respiratory failure with hypoxia ([address] [J96.01] 2022     Priority: Medium    COPD with acute exacerbation ([address] [J44.1] 2022     Priority: Medium    Tobacco abuse disorder [Z72.0] 2022     Priority: Medium    Alcohol abuse [F10.10] 2022     Priority: Medium    Weight loss, unintentional [R63.4] 2022     Priority: Medium    Sepsis ([address] [A41.9] 2022     Priority: Medium         Condition at Discharge:       Hospital Course:   75 y.o. male with history of tobacco abuse, alcohol abuse who came to ER with complaints of hemoptysis and SOB. Patient has been using his brother's oxygen at home. Has been having worsening SOB for months and lost 10 lbs unintentionally in past month. Family forced patient to call for help today when he coughed up blood. Has SOB at rest that has progressed from dyspnea on exertion. Has L sided chest pain with cough. Some chills. No HA, abdominal pain, nausea, vomiting or abdominal pain. In ED, patient placed on 15 L HFNC.   Found to have dense L consolidation on CT Chest with massive BL blebs. Admitted as inpatient to ICU for pneumonia with sepsis, acute respiratory failure with hypoxia and hemoptysis. Treated with IVF and IV Abx. Placed on HFNC. Started on IV steroids. Patient RAPIDLY worsened in ED. Emergently intubated as hypoxia worsened. Developed VFib and PEA. Unable to achieve ROSC. Patient  at 2159 on 22 from tobacco abuse. Discharge Medications:   Discharge Medication List as of 2022 11:58 PM        Discharge Medication List as of 2022 11:58 PM        Discharge Medication List as of 2022 11:58 PM        CONTINUE these medications which have NOT CHANGED    Details   albuterol sulfate HFA (VENTOLIN HFA) 108 (90 Base) MCG/ACT inhaler Inhale 2 puffs into the lungs every 6 hours as needed for WheezingHistorical Med      UNKNOWN TO PATIENT Pt states takes inhalers, nebulizers but unsure whatHistorical Med           Discharge Medication List as of 2022 11:58 PM        Discharge Exam:    /79   Pulse (!) 106   Temp 98.1 °F (36.7 °C) (Oral)   Resp 18   Ht 5' 8\" (1.727 m)   Wt 150 lb (68 kg)   SpO2 (!) 82%   BMI 22.81 kg/m²   See Dr Darrin Corcoran note from [phone] on 22    Labs:  For convenience and continuity at follow-up the following most recent labs are provided:    Lab Results   Component Value Date/Time    WBC 47.8 2022 02:16 PM    HGB 12.3 2022 02:16 PM    HCT 36.9 2022 02:16 PM    MCV 93.2 2022 02:16 PM     2022 02:16 PM     2022 02:16 PM    K 3.4 2022 02:16 PM    CL 84 2022 02:16 PM    CO2 36 2022 02:16 PM    BUN 17 2022 02:16 PM    CREATININE 0.8 2022 02:16 PM    CALCIUM 8.7 2022 02:16 PM    ALKPHOS 135 2022 02:16 PM    ALT 12 2022 02:16 PM    AST 19 2022 02:16 PM    BILITOT 0.4 2022 02:16 PM    LABALBU 2.4 2022 02:16 PM     No results found for: INR    Radiology:  XR CHEST PORTABLE    Result Date: 11/23/2022  EXAMINATION: ONE XRAY VIEW OF THE CHEST 11/23/2022 9:18 pm COMPARISON: Radiographs and CT earlier today. HISTORY: ORDERING SYSTEM PROVIDED HISTORY: central line insertion verification TECHNOLOGIST PROVIDED HISTORY: Reason for exam:->central line insertion verification Reason for Exam: ett placment FINDINGS: The endotracheal tube terminates in appropriate position above the bernard. New right internal jugular line tip is at the level of the mid SVC . The cardiac and mediastinal contours appear unchanged. Severe bullous emphysematous disease again noted. Infiltrate in the left lower lobe and blunting of the costophrenic angle is again demonstrated. No new findings identified in the interval.  No evidence for pneumothorax. R the ight internal jugular line terminates at the level of the mid SVC. XR CHEST PORTABLE    Result Date: 11/23/2022  EXAMINATION: ONE XRAY VIEW OF THE CHEST 11/23/2022 4:53 pm COMPARISON: 11/23/2022 at 15:11 HISTORY: [address]: Tube placement TECHNOLOGIST PROVIDED HISTORY: Reason for exam:->Tube placement Reason for Exam: Tube placement FINDINGS: There has been interval placement of an endotracheal tube, with its tip in the expected position, is projected over the mid trachea. Consolidation in the left lower lung is again noted. Severe emphysematous changes noted bilaterally. Tip of the endotracheal tube is in the expected position, projected over the mid trachea. Continued extensive left lower lung consolidation.      XR CHEST 1 VIEW    Result Date: 11/23/2022  EXAMINATION: ONE XRAY VIEW OF THE CHEST 11/23/2022 3:09 pm COMPARISON: Chest x-ray dated 3 July 2021 HISTORY: ORDERING SYSTEM PROVIDED HISTORY: hypoxia, cough, dyspnea TECHNOLOGIST PROVIDED HISTORY: Reason for exam:->hypoxia, cough, dyspnea Reason for Exam: hypoxia, cough, dyspnea FINDINGS: Severe bullous changes in the lung apices, right greater than left appears similar to the prior study. Left lower lobe airspace infiltrate and small left pleural effusion. No definite pneumothorax. Stable cardiomediastinal silhouette     1. Left lower lobe airspace infiltrate and small left pleural effusion. This could represent pneumonia in the appropriate clinical setting. 2.  Bullous emphysematous change in the lung apices. CT CHEST PULMONARY EMBOLISM W CONTRAST    Result Date: 11/23/2022  EXAMINATION: CTA OF THE CHEST 11/23/2022 12:25 pm TECHNIQUE: CTA of the chest was performed after the administration of intravenous contrast.  Multiplanar reformatted images are provided for review. MIP images are provided for review. Automated exposure control, iterative reconstruction, and/or weight based adjustment of the mA/kV was utilized to reduce the radiation dose to as low as reasonably achievable. COMPARISON: Chest radiograph, 11/23/2022 HISTORY: ORDERING SYSTEM PROVIDED HISTORY: coughing up blood x 1 day, hx chronic cough/dyspnea TECHNOLOGIST PROVIDED HISTORY: Reason for exam:->coughing up blood x 1 day, hx chronic cough/dyspnea Decision Support Exception - unselect if not a suspected or confirmed emergency medical condition->Emergency Medical Condition (MA) Reason for Exam: coughing up blood x 1 day, hx chronic cough/dyspnea FINDINGS: Pulmonary Arteries: The pulmonary arteries are adequately opacified. No filling defects are seen within the pulmonary arteries to suggest pulmonary embolism. Mediastinum: Visualized thyroid unremarkable. Shotty mediastinal lymph nodes are seen which are likely reactive. Esophagus unremarkable. Cardiac chambers unremarkable. Thoracic aorta is normal in caliber without evidence of dissection. Lungs/pleura: There is severe bullous emphysema, with very large bulla in the upper lungs bilaterally. There is extensive consolidation within the left lower lobe. All in all, only a small amount of the left lung remains aerated.  No infiltrates are seen on the right, although the emphysema is severe. Filling defects are seen within the left lower lung airways. Bronchial wall thickening on the right is seen, but no filling defects are seen. Upper Abdomen: Limited images of the upper abdomen are unremarkable. Soft Tissues/Bones: No acute bone or soft tissue abnormality. No evidence of pulmonary embolism or dissection. Severe bullous emphysema. Extensive consolidation within the left lower lung, some combination of pneumonia, aspiration, and blood products. Neoplasm remains in the differential. Filling defects are seen within the lower lung airways on the left, compatible with mucous plugging, blood products, or aspiration. The patient was seen and examined on day of discharge and this discharge summary is in conjunction with any daily progress note from day of discharge. Time Spent on discharge is 15 minutes  in the examination, evaluation, counseling and review of medications and discharge plan. Note that less than 30 minutes was spent in preparing discharge papers, discussing discharge with patient, medication review, etc.       Signed:    Onur Harris MD   11/24/2022      Thank you No primary care provider on file. for the opportunity to be involved in this patient's care.  If you have any questions or concerns please feel free to contact me at [address] [place] [address]

## 2022-11-24 NOTE — PROGRESS NOTES
Patient successfully intubated with #7.5 Et tube secured at [employer] by Dr. Susana Boston. ET tube placement confirmed with Capnography, CXR and auscultation. Patient placed on Vent settings  AC 20, 450, 100%,+10 per Dr. Susana Boston as well.

## 2022-11-24 NOTE — DEATH NOTES
Death Pronouncement Note  Patient's Name: Markel Hess   Patient's YOB: 1955  MRN Number: [id number]    Admitting Provider: Yuko Mcadams MD    I personally evaluated the patient at the bedside. Patient was found to be without cardiac activity on monitors, absent brainstem reflexes, no pupillary response, pupils were fixed, dilated. Patient was areflexive with no spontaneous respirations or respiratory movements. No palpable pulses throughout. No response to painful stimuli. GCS 3T. Time of death called 9:59 PM     Physical Exam:  VS: No palpable pulse, no blood pressure.   Gen: Pallor with no movement  HEENT: Pupils fixed and dilated  CVS: No heart sounds audible  Chest: No audible lung sounds  Neuro: Absent reflexes  Patient was examined and the following were absent: Pulses, Blood Pressure, and Respiratory effort    I declared the patient dead on 11/23/2022 at 9:59 PM    Preliminary Cause of Death:      Electronically signed by Nicky Valerio DO on 11/24/22 at 5:51 AM EST

## 2022-11-24 NOTE — ED NOTES
Patient was taken to CT for a head CT, while waiting to enter room, this RN noted heart rate decreasing, patient was taken back to room and Dr. Ruslan Mares informed     Sumi Titus, IKE  11/23/22 1244

## 2022-11-25 LAB
ADENOVIRUS PCR: NOT DETECTED
HUMAN METAPNEUMOVIRUS PCR: NOT DETECTED
INFLUENZA A: NOT DETECTED
INFLUENZA B: NOT DETECTED
PARAINFLUENZA 1 PCR: NOT DETECTED
PARAINFLUENZA 2 PCR: NOT DETECTED
PARAINFLUENZA 3 PCR: NOT DETECTED
PARAINFLUENZA 4 PCR: NOT DETECTED
RHINO/ENTEROVIRUS PCR: NOT DETECTED
RSV BY PCR: NOT DETECTED
RSV SOURCE: NORMAL

## 2022-11-27 LAB
BLOOD CULTURE, ROUTINE: NORMAL
CULTURE, BLOOD 2: NORMAL

## 2022-12-02 NOTE — PROGRESS NOTES
Physician Progress Note      Porfirio Florian  [facility] #:                  [id number]  :                       1955  ADMIT DATE:       2022 1:58 PM  [address] [place] DATE:        2022 11:55 PM  RESPONDING  PROVIDER #:        Tc Taylor X          QUERY TEXT:    Pt admitted with sepsis due to pneumonia. Noted to have central venous line   placed for indication of shock by Dr. Richa Greene. If possible, please document in   the progress notes and discharge summary if you are evaluating and/or treating   any of the following: The medical record reflects the following:  Risk Factors: 76 yo with sepsis due to pneumonia and respiratory failure    Clinical Indicators:  - WBC 47.8, Lactic acid 2.5, , RR 38, Pt   intubated due to worsening respiratory failure (HP)    Treatment: ICU, Central venous line, Levophed, IVF, abx, pt . Options provided:  -- Septic Shock  -- Other - I will add my own diagnosis  -- Disagree - Not applicable / Not valid  -- Disagree - Clinically unable to determine / Unknown  -- Refer to Clinical Documentation Reviewer    PROVIDER RESPONSE TEXT:    This patient is in septic shock. Query created by: Diana Shell on 2022 3:36 PM      Electronically signed by:   Mariposa Wilcox X 2022 7:34 PM